# Patient Record
Sex: FEMALE | Race: WHITE | Employment: UNEMPLOYED | URBAN - METROPOLITAN AREA
[De-identification: names, ages, dates, MRNs, and addresses within clinical notes are randomized per-mention and may not be internally consistent; named-entity substitution may affect disease eponyms.]

---

## 2021-08-01 ENCOUNTER — HOSPITAL ENCOUNTER (EMERGENCY)
Age: 51
Discharge: HOME | End: 2021-08-01
Attending: EMERGENCY MEDICINE
Payer: MEDICARE

## 2021-08-01 ENCOUNTER — APPOINTMENT (OUTPATIENT)
Dept: GENERAL RADIOLOGY | Age: 51
End: 2021-08-01
Payer: MEDICARE

## 2021-08-01 VITALS
HEART RATE: 80 BPM | DIASTOLIC BLOOD PRESSURE: 84 MMHG | BODY MASS INDEX: 31.89 KG/M2 | SYSTOLIC BLOOD PRESSURE: 148 MMHG | WEIGHT: 180 LBS | HEIGHT: 63 IN | OXYGEN SATURATION: 98 % | TEMPERATURE: 97.4 F | RESPIRATION RATE: 16 BRPM

## 2021-08-01 DIAGNOSIS — S82.402A CLOSED FRACTURE OF SHAFT OF LEFT FIBULA, UNSPECIFIED FRACTURE MORPHOLOGY, INITIAL ENCOUNTER: Primary | ICD-10-CM

## 2021-08-01 PROCEDURE — 29515 APPLICATION SHORT LEG SPLINT: CPT

## 2021-08-01 PROCEDURE — 99283 EMERGENCY DEPT VISIT LOW MDM: CPT

## 2021-08-01 PROCEDURE — 96372 THER/PROPH/DIAG INJ SC/IM: CPT

## 2021-08-01 PROCEDURE — 73610 X-RAY EXAM OF ANKLE: CPT

## 2021-08-01 PROCEDURE — 6360000002 HC RX W HCPCS: Performed by: EMERGENCY MEDICINE

## 2021-08-01 RX ORDER — KETOROLAC TROMETHAMINE 30 MG/ML
60 INJECTION, SOLUTION INTRAMUSCULAR; INTRAVENOUS ONCE
Status: COMPLETED | OUTPATIENT
Start: 2021-08-01 | End: 2021-08-01

## 2021-08-01 RX ORDER — CYCLOBENZAPRINE HCL 10 MG
10 TABLET ORAL 3 TIMES DAILY PRN
Qty: 30 TABLET | Refills: 0 | Status: SHIPPED | OUTPATIENT
Start: 2021-08-01 | End: 2021-08-11

## 2021-08-01 RX ORDER — KETOROLAC TROMETHAMINE 10 MG/1
10 TABLET, FILM COATED ORAL EVERY 6 HOURS PRN
Qty: 20 TABLET | Refills: 0 | Status: SHIPPED | OUTPATIENT
Start: 2021-08-01

## 2021-08-01 RX ADMIN — KETOROLAC TROMETHAMINE 60 MG: 30 INJECTION, SOLUTION INTRAMUSCULAR at 11:37

## 2021-08-01 ASSESSMENT — PAIN SCALES - GENERAL
PAINLEVEL_OUTOF10: 7
PAINLEVEL_OUTOF10: 5

## 2021-08-01 ASSESSMENT — ENCOUNTER SYMPTOMS
ABDOMINAL PAIN: 0
WHEEZING: 0
PHOTOPHOBIA: 0
SINUS PRESSURE: 0
SORE THROAT: 0
APNEA: 0
VOMITING: 0
BACK PAIN: 0
COLOR CHANGE: 0
RHINORRHEA: 0
CONSTIPATION: 0
ABDOMINAL DISTENTION: 0
EYE PAIN: 0
SHORTNESS OF BREATH: 0
COUGH: 0
DIARRHEA: 0
NAUSEA: 0

## 2021-08-01 ASSESSMENT — PAIN DESCRIPTION - ORIENTATION: ORIENTATION: LEFT

## 2021-08-01 ASSESSMENT — PAIN DESCRIPTION - LOCATION: LOCATION: ANKLE

## 2021-08-01 ASSESSMENT — PAIN DESCRIPTION - DESCRIPTORS: DESCRIPTORS: ACHING

## 2021-08-01 NOTE — ED PROVIDER NOTES
[facility] ED  eMERGENCY dEPARTMENT eNCOUnter      Pt Name: Grady Campbell  MRN: [id number]  [place] 1970  Date of evaluation: 8/1/2021  Provider: Oksana Pallas, MD    CHIEF COMPLAINT       Chief Complaint   Patient presents with    Ankle Pain     left ankle         HISTORY OF PRESENT ILLNESS   (Location/Symptom, Timing/Onset,Context/Setting, Quality, Duration, Modifying Factors, Severity)  Note limiting factors. Grady Campbell is a 46 y.o. female who presents to the emergency department with complaint of left ankle pain and swelling status post trip and fall at home yesterday evening. No head injury with the fall. Able to bear weight but with significant pain. Pain is 7 in a scale of 1-10 and sharp. Pain does not radiate. No other injuries or systemic symptoms. HPI    Nursing Notes were reviewed. REVIEW OF SYSTEMS    (2-9 systems for level 4, 10 or more for level 5)     Review of Systems   Constitutional: Negative. Negative for activity change, appetite change, chills, fatigue and fever. HENT: Negative for congestion, ear discharge, ear pain, hearing loss, rhinorrhea, sinus pressure and sore throat. Eyes: Negative for photophobia, pain and visual disturbance. Respiratory: Negative for apnea, cough, shortness of breath and wheezing. Cardiovascular: Negative for chest pain, palpitations and leg swelling. Gastrointestinal: Negative for abdominal distention, abdominal pain, constipation, diarrhea, nausea and vomiting. Endocrine: Negative for cold intolerance, heat intolerance and polyuria. Genitourinary: Negative for dysuria, flank pain, frequency and urgency. Musculoskeletal: Positive for arthralgias and gait problem. Negative for back pain, myalgias and neck stiffness. Skin: Negative for color change, pallor and rash. Allergic/Immunologic: Negative for food allergies and immunocompromised state.    Neurological: Negative for dizziness, tremors, syncope, weakness, light-headedness and headaches. Psychiatric/Behavioral: Negative for agitation, confusion and hallucinations. All other systems reviewed and are negative. Except as noted above the remainder of the review of systems was reviewed and negative. PAST MEDICAL HISTORY   History reviewed. No pertinent past medical history. SURGICAL HISTORY     History reviewed. No pertinent surgical history. CURRENT MEDICATIONS       Discharge Medication List as of 8/1/2021 12:36 PM          ALLERGIES     Patient has no known allergies. FAMILY HISTORY     History reviewed. No pertinent family history. SOCIAL HISTORY       Social History     Socioeconomic History    Marital status:      Spouse name: None    Number of children: None    Years of education: None    Highest education level: None   Occupational History    None   Tobacco Use    Smoking status: Never Smoker    Smokeless tobacco: Never Used   Substance and Sexual Activity    Alcohol use: Not Currently    Drug use: Not Currently    Sexual activity: None   Other Topics Concern    None   Social History Narrative    None     Social Determinants of Health     Financial Resource Strain:     Difficulty of Paying Living Expenses:    Food Insecurity:     Worried About Running Out of Food in the Last Year:     Ran Out of Food in the Last Year:    Transportation Needs:     Lack of Transportation (Medical):      Lack of Transportation (Non-Medical):    Physical Activity:     Days of Exercise per Week:     Minutes of Exercise per Session:    Stress:     Feeling of Stress :    Social Connections:     Frequency of Communication with Friends and Family:     Frequency of Social Gatherings with Friends and Family:     Attends [employer]oration[employer] Services:     Active Member of Clubs or Organizations:     Attends Club or Organization Meetings:     Marital Status:    Intimate Partner Violence:     Fear of Current or Ex-Partner:     Emotionally Abused:     Physically Abused:     Sexually Abused:        SCREENINGS             PHYSICAL EXAM    (up to 7 for level 4, 8 or more for level 5)     ED Triage Vitals   BP Temp Temp Source Pulse Resp SpO2 Height Weight   08/01/21 1132 08/01/21 1132 08/01/21 1132 08/01/21 1132 08/01/21 1132 08/01/21 1132 08/01/21 1131 08/01/21 1131   (!) 151/100 97.4 °F (36.3 °C) Temporal 80 16 98 % 5' 3\" (1.6 m) 180 lb (81.6 kg)       Physical Exam  Vitals and nursing note reviewed. Constitutional:       General: She is not in acute distress. Appearance: Normal appearance. She is well-developed. She is obese. She is not ill-appearing, toxic-appearing or diaphoretic. HENT:      Head: Normocephalic and atraumatic. Nose: Nose normal. No congestion or rhinorrhea. Mouth/Throat:      Mouth: Mucous membranes are moist.      Pharynx: Oropharynx is clear. No oropharyngeal exudate or posterior oropharyngeal erythema. Eyes:      General: No scleral icterus. Right eye: No discharge. Left eye: No discharge. Extraocular Movements: Extraocular movements intact. Conjunctiva/sclera: Conjunctivae normal.      Pupils: Pupils are equal, round, and reactive to light. Neck:      Thyroid: No thyromegaly. Vascular: No carotid bruit or JVD. Trachea: No tracheal deviation. Cardiovascular:      Rate and Rhythm: Normal rate and regular rhythm. Heart sounds: Normal heart sounds. No murmur heard. No friction rub. No gallop. Pulmonary:      Effort: Pulmonary effort is normal. No respiratory distress. Breath sounds: Normal breath sounds. No stridor. No wheezing, rhonchi or rales. Chest:      Chest wall: No tenderness. Abdominal:      General: Bowel sounds are normal. There is no distension. Palpations: Abdomen is soft. There is no mass. Tenderness: There is no abdominal tenderness. There is no right CVA tenderness, left CVA tenderness, guarding or rebound.       Hernia: No hernia is present. Musculoskeletal:         General: Swelling, tenderness and signs of injury present. No deformity. Normal range of motion. Cervical back: Normal range of motion and neck supple. No rigidity or tenderness. Right lower leg: No edema. Left lower leg: No edema. Lymphadenopathy:      Cervical: No cervical adenopathy. Skin:     General: Skin is warm and dry. Capillary Refill: Capillary refill takes less than 2 seconds. Coloration: Skin is not jaundiced or pale. Findings: No bruising, erythema, lesion or rash. Neurological:      General: No focal deficit present. Mental Status: She is alert and oriented to person, place, and time. Mental status is at baseline. Cranial Nerves: No cranial nerve deficit. Sensory: No sensory deficit. Motor: No weakness or abnormal muscle tone. Coordination: Coordination normal.      Gait: Gait normal.      Deep Tendon Reflexes: Reflexes are normal and symmetric. Reflexes normal.   Psychiatric:         Behavior: Behavior normal.         Thought Content: Thought content normal.         Judgment: Judgment normal.         DIAGNOSTIC RESULTS     EKG: All EKG's are interpreted by the Emergency Department Physician who either signs or Co-signs this chart in the absence of a cardiologist.        RADIOLOGY:   Non-plain film images such as CT, Ultrasound and MRI are read by the radiologist. [facility] are visualized and preliminarily interpreted by the emergency physician with the below findings:    Left ankle x-ray shows distal fibula fracture. Mildly displaced. Ankle mortise joint is intact.     Interpretation per the Radiologist below, if available at the time of this note:    XR ANKLE LEFT (MIN 3 VIEWS)   Final Result   DISTAL LEFT FIBULAR FRACTURE            ED BEDSIDE ULTRASOUND:   Performed by ED Physician - none    LABS:  Labs Reviewed - No data to display    All other labs were within normal range or not returned as of this dictation. EMERGENCY DEPARTMENT COURSE and DIFFERENTIALDIAGNOSIS/MDM:   Vitals:    Vitals:    08/01/21 1131 08/01/21 1132 08/01/21 1240   BP:  (!) 151/100 (!) 148/84   Pulse:  80    Resp:  16    Temp:  97.4 °F (36.3 °C)    TempSrc:  Temporal    SpO2:  98%    Weight: 180 lb (81.6 kg)     Height: 5' 3\" (1.6 m)             MDM  Number of Diagnoses or Management Options     Amount and/or Complexity of Data Reviewed  Tests in the radiology section of CPT®: reviewed and ordered    Risk of Complications, Morbidity, and/or Mortality  Presenting problems: moderate  Diagnostic procedures: moderate  Management options: moderate    Patient Progress  Patient progress: improved      CRITICAL CARE TIME   Total Critical Care time was  minutes, excluding separately reportable procedures. There was a high probability of clinically significant/life threatening deterioration in the patient's condition which required my urgentintervention. CONSULTS:  None    PROCEDURES:  Unless otherwise noted below, none     Splint Application    Date/Time: 8/1/2021 12:18 PM  Performed by: Michaela Nix MD  Authorized by: Michaela Nix MD     Consent:     Consent obtained:  Verbal and written    Consent given by:  Patient    Risks discussed:  Discoloration, numbness, pain and swelling    Alternatives discussed:  Referral, observation, alternative treatment, delayed treatment and no treatment  Pre-procedure details:     Sensation:  Normal    Skin color:  Pink  Procedure details:     Laterality:  Left    Location:  Ankle    Ankle:  L ankle    Strapping: yes      Cast type:  Short leg    Splint type:  Short leg    Supplies:  Elastic bandage, cotton padding and Ortho-Glass  Post-procedure details:     Pain:  Improved    Sensation:  Normal    Skin color:  Pink    Patient tolerance of procedure: Tolerated well, no immediate complications        FINAL IMPRESSION      1.  Closed fracture of shaft of left fibula, unspecified fracture morphology, initial encounter          DISPOSITION/PLAN   DISPOSITION Decision To Discharge 08/01/2021 12:29:36 PM      PATIENT REFERRED TO:  Carlos Ramsey MD  [address] [address] [phone]    In 1 day        DISCHARGE MEDICATIONS:  Discharge Medication List as of 8/1/2021 12:36 PM      START taking these medications    Details   ketorolac (TORADOL) 10 MG tablet Take 1 tablet by mouth every 6 hours as needed for Pain, Disp-20 tablet, R-0Print      cyclobenzaprine (FLEXERIL) 10 MG tablet Take 1 tablet by mouth 3 times daily as needed for Muscle spasms, Disp-30 tablet, R-0Print                (Please note that portions of this note were completed with a voice recognitionprogram.  Efforts were made to edit the dictations but occasionally words are mis-transcribed.)    Morales Swanson MD (electronically signed)  Attending Emergency Physician          Morales Swanson MD  08/01/21 50 Rebekah Nunes MD  08/02/21 7944

## 2021-08-01 NOTE — ED NOTES
Circulation is reassessed in affected extremity & their capillary refill is < 3 seconds. The client denies any increased pain, new pain, or numbness after the splint was applied (left short leg splint).      Jules Andrade RN  08/01/21 0940

## 2021-08-01 NOTE — ED TRIAGE NOTES
Patient slipped on gravel yesterday and rolled left ankle. Complains of pain and swelling to left ankle. She took Motrin for the pain. She has difficulty putting weight on her left ankle and she has applied ice.

## 2021-10-11 ENCOUNTER — OFFICE VISIT (OUTPATIENT)
Dept: FAMILY MEDICINE CLINIC | Age: 51
End: 2021-10-11
Payer: MEDICARE

## 2021-10-11 VITALS
HEART RATE: 70 BPM | OXYGEN SATURATION: 97 % | WEIGHT: 185.4 LBS | DIASTOLIC BLOOD PRESSURE: 72 MMHG | BODY MASS INDEX: 32.85 KG/M2 | SYSTOLIC BLOOD PRESSURE: 136 MMHG | HEIGHT: 63 IN | TEMPERATURE: 97.2 F

## 2021-10-11 DIAGNOSIS — J30.9 ALLERGIC RHINITIS, UNSPECIFIED SEASONALITY, UNSPECIFIED TRIGGER: Primary | ICD-10-CM

## 2021-10-11 PROCEDURE — 1036F TOBACCO NON-USER: CPT

## 2021-10-11 PROCEDURE — G8484 FLU IMMUNIZE NO ADMIN: HCPCS

## 2021-10-11 PROCEDURE — 3017F COLORECTAL CA SCREEN DOC REV: CPT

## 2021-10-11 PROCEDURE — G8427 DOCREV CUR MEDS BY ELIG CLIN: HCPCS

## 2021-10-11 PROCEDURE — G8417 CALC BMI ABV UP PARAM F/U: HCPCS

## 2021-10-11 PROCEDURE — 99213 OFFICE O/P EST LOW 20 MIN: CPT

## 2021-10-11 SDOH — ECONOMIC STABILITY: TRANSPORTATION INSECURITY
IN THE PAST 12 MONTHS, HAS LACK OF TRANSPORTATION KEPT YOU FROM MEETINGS, WORK, OR FROM GETTING THINGS NEEDED FOR DAILY LIVING?: NO

## 2021-10-11 SDOH — ECONOMIC STABILITY: FOOD INSECURITY: WITHIN THE PAST 12 MONTHS, YOU WORRIED THAT YOUR FOOD WOULD RUN OUT BEFORE YOU GOT MONEY TO BUY MORE.: NEVER TRUE

## 2021-10-11 SDOH — ECONOMIC STABILITY: TRANSPORTATION INSECURITY
IN THE PAST 12 MONTHS, HAS THE LACK OF TRANSPORTATION KEPT YOU FROM MEDICAL APPOINTMENTS OR FROM GETTING MEDICATIONS?: NO

## 2021-10-11 SDOH — ECONOMIC STABILITY: FOOD INSECURITY: WITHIN THE PAST 12 MONTHS, THE FOOD YOU BOUGHT JUST DIDN'T LAST AND YOU DIDN'T HAVE MONEY TO GET MORE.: NEVER TRUE

## 2021-10-11 ASSESSMENT — PATIENT HEALTH QUESTIONNAIRE - PHQ9
SUM OF ALL RESPONSES TO PHQ QUESTIONS 1-9: 0
SUM OF ALL RESPONSES TO PHQ QUESTIONS 1-9: 0
1. LITTLE INTEREST OR PLEASURE IN DOING THINGS: 0
SUM OF ALL RESPONSES TO PHQ9 QUESTIONS 1 & 2: 0
SUM OF ALL RESPONSES TO PHQ QUESTIONS 1-9: 0
2. FEELING DOWN, DEPRESSED OR HOPELESS: 0

## 2021-10-11 ASSESSMENT — ENCOUNTER SYMPTOMS
DIARRHEA: 0
VOMITING: 0
EYE ITCHING: 0
NAUSEA: 0
APNEA: 0
FACIAL SWELLING: 0
WHEEZING: 0
CHEST TIGHTNESS: 0
TROUBLE SWALLOWING: 0
SORE THROAT: 0
RHINORRHEA: 0
ABDOMINAL PAIN: 0
BACK PAIN: 0
SINUS PAIN: 0
EYE DISCHARGE: 0
COUGH: 0
EYE PAIN: 0
SHORTNESS OF BREATH: 0
SINUS PRESSURE: 0
COLOR CHANGE: 0

## 2021-10-11 ASSESSMENT — SOCIAL DETERMINANTS OF HEALTH (SDOH): HOW HARD IS IT FOR YOU TO PAY FOR THE VERY BASICS LIKE FOOD, HOUSING, MEDICAL CARE, AND HEATING?: NOT HARD AT ALL

## 2021-10-11 NOTE — PROGRESS NOTES
[address] Encounter  CHIEF COMPLAINT       Chief Complaint   Patient presents with    Ear Problem     ear pain in both ears and headache        HISTORY OF PRESENT ILLNESS   Naa Allen is a 46 y.o. female who presents with:  HPI  Reporting symptoms of congestion 1 week ago mild scratchy throat denies cough  REVIEW OF SYSTEMS     Review of Systems   Constitutional: Negative for appetite change, chills, diaphoresis, fatigue and fever. HENT: Positive for congestion. Negative for ear discharge, ear pain, facial swelling, hearing loss, mouth sores, postnasal drip, rhinorrhea, sinus pressure, sinus pain, sore throat and trouble swallowing. Eyes: Negative for pain, discharge and itching. Respiratory: Negative for apnea, cough, chest tightness, shortness of breath and wheezing. Cardiovascular: Negative for chest pain. Gastrointestinal: Negative for abdominal pain, diarrhea, nausea and vomiting. Endocrine: Negative for cold intolerance and heat intolerance. Genitourinary: Negative for decreased urine volume and difficulty urinating. Musculoskeletal: Negative for arthralgias, back pain and myalgias. Skin: Negative for color change, pallor and rash. Neurological: Negative for dizziness, syncope, weakness, light-headedness and headaches. Hematological: Negative for adenopathy. Psychiatric/Behavioral: Negative for behavioral problems, confusion and sleep disturbance. PAST MEDICAL HISTORY   History reviewed. No pertinent past medical history. SURGICAL HISTORY     Patient  has no past surgical history on file. CURRENT MEDICATIONS       Previous Medications    KETOROLAC (TORADOL) 10 MG TABLET    Take 1 tablet by mouth every 6 hours as needed for Pain     ALLERGIES     Patient is has No Known Allergies. FAMILY HISTORY     Patient'sfamily history is not on file. HISTORY     Patient  reports that she has never smoked.  She has never used smokeless tobacco. She reports previous alcohol use. She reports previous drug use. PHYSICAL EXAM     VITALS  BP: 136/72, Temp: 97.2 °F (36.2 °C), Pulse: 70,  , SpO2: 97 %  Physical Exam  Constitutional:       General: She is not in acute distress. Appearance: Normal appearance. She is not ill-appearing, toxic-appearing or diaphoretic. HENT:      Head: Normocephalic. Right Ear: Tympanic membrane, ear canal and external ear normal. There is no impacted cerumen. Left Ear: Tympanic membrane, ear canal and external ear normal. There is no impacted cerumen. Nose: No congestion or rhinorrhea. Mouth/Throat:      Mouth: Mucous membranes are moist.      Pharynx: Oropharynx is clear. No oropharyngeal exudate or posterior oropharyngeal erythema. Eyes:      General:         Right eye: No discharge. Left eye: No discharge. Cardiovascular:      Rate and Rhythm: Normal rate and regular rhythm. Pulses: Normal pulses. Heart sounds: Normal heart sounds. No murmur heard. No gallop. Pulmonary:      Effort: Pulmonary effort is normal. No respiratory distress. Breath sounds: Normal breath sounds. No stridor. No wheezing, rhonchi or rales. Chest:      Chest wall: No tenderness. Abdominal:      General: Abdomen is flat. There is no distension. Palpations: Abdomen is soft. Tenderness: There is no abdominal tenderness. Musculoskeletal:         General: Normal range of motion. Cervical back: No rigidity or tenderness. Lymphadenopathy:      Cervical: No cervical adenopathy. Skin:     General: Skin is warm and dry. Capillary Refill: Capillary refill takes less than 2 seconds. Coloration: Skin is not pale. Neurological:      General: No focal deficit present. Mental Status: She is alert and oriented to person, place, and time. Mental status is at baseline.    Psychiatric:         Mood and Affect: Mood normal.         Behavior: Behavior normal.       READY CARE COURSE   No orders of the defined types were placed in this encounter. Labs:  No results found for this visit on 10/11/21. IMAGING:  No orders to display     Scheduled Meds:  Continuous Infusions:  PRN Meds:. PROCEDURES:  FINAL IMPRESSION      1. Allergic rhinitis, unspecified seasonality, unspecified trigger        DISPOSITION/PLAN     HISTORY OF PRESENT ILLNESS   Catie Monteiro is a 46 y.o. female who presents with patient reporting sinus congestion ear pressure starting last Thursday. She has been taking Zyrtec however she felt it dried up her secretions too much and she quit taking it yesterday. Pt is afebrile has nontoxic appearance and VS are stable. On physical exam right ear TM appears bulging no erythema left ear within normal limits, pharynx is pink and moist neck is supple no masses lung sounds are clear symptoms are consistent with sinus congestion related to allergies. Recommend patient start taking decongestant phenylephedrine continue Zyrtec. May take half Zyrtec dose if she feels it is drying her out too much. PATIENT REFERRED TO:  Return if symptoms worsen or fail to improve, for Follow up with PCP. DISCHARGE MEDICATIONS:  New Prescriptions    No medications on file     Cannot display discharge medications since this is not an admission.        Ramona Domínguez, APRN - CNP

## 2021-10-11 NOTE — PATIENT INSTRUCTIONS
Take phenylephedrine for congestion, continue using Zyrtec, if you feel this is drying you  up too much dry cutting tablet in half.

## 2023-03-09 LAB
ALANINE AMINOTRANSFERASE (SGPT) (U/L) IN SER/PLAS: 12 U/L (ref 7–45)
ALBUMIN (G/DL) IN SER/PLAS: 4.6 G/DL (ref 3.4–5)
ALKALINE PHOSPHATASE (U/L) IN SER/PLAS: 55 U/L (ref 33–110)
ANION GAP IN SER/PLAS: 13 MMOL/L (ref 10–20)
ASPARTATE AMINOTRANSFERASE (SGOT) (U/L) IN SER/PLAS: 17 U/L (ref 9–39)
BASOPHILS (10*3/UL) IN BLOOD BY AUTOMATED COUNT: 0.05 X10E9/L (ref 0–0.1)
BASOPHILS/100 LEUKOCYTES IN BLOOD BY AUTOMATED COUNT: 1 % (ref 0–2)
BILIRUBIN TOTAL (MG/DL) IN SER/PLAS: 0.5 MG/DL (ref 0–1.2)
CALCIUM (MG/DL) IN SER/PLAS: 9.6 MG/DL (ref 8.6–10.6)
CARBON DIOXIDE, TOTAL (MMOL/L) IN SER/PLAS: 28 MMOL/L (ref 21–32)
CHLORIDE (MMOL/L) IN SER/PLAS: 102 MMOL/L (ref 98–107)
CHOLESTEROL (MG/DL) IN SER/PLAS: 160 MG/DL (ref 0–199)
CHOLESTEROL IN HDL (MG/DL) IN SER/PLAS: 55.4 MG/DL
CHOLESTEROL/HDL RATIO: 2.9
CREATININE (MG/DL) IN SER/PLAS: 0.69 MG/DL (ref 0.5–1.05)
EOSINOPHILS (10*3/UL) IN BLOOD BY AUTOMATED COUNT: 0.15 X10E9/L (ref 0–0.7)
EOSINOPHILS/100 LEUKOCYTES IN BLOOD BY AUTOMATED COUNT: 3 % (ref 0–6)
ERYTHROCYTE DISTRIBUTION WIDTH (RATIO) BY AUTOMATED COUNT: 12 % (ref 11.5–14.5)
ERYTHROCYTE MEAN CORPUSCULAR HEMOGLOBIN CONCENTRATION (G/DL) BY AUTOMATED: 32.6 G/DL (ref 32–36)
ERYTHROCYTE MEAN CORPUSCULAR VOLUME (FL) BY AUTOMATED COUNT: 91 FL (ref 80–100)
ERYTHROCYTES (10*6/UL) IN BLOOD BY AUTOMATED COUNT: 4.76 X10E12/L (ref 4–5.2)
GFR FEMALE: >90 ML/MIN/1.73M2
GLUCOSE (MG/DL) IN SER/PLAS: 93 MG/DL (ref 74–99)
HEMATOCRIT (%) IN BLOOD BY AUTOMATED COUNT: 43.2 % (ref 36–46)
HEMOGLOBIN (G/DL) IN BLOOD: 14.1 G/DL (ref 12–16)
IMMATURE GRANULOCYTES/100 LEUKOCYTES IN BLOOD BY AUTOMATED COUNT: 0.2 % (ref 0–0.9)
LDL: 87 MG/DL (ref 0–99)
LEUKOCYTES (10*3/UL) IN BLOOD BY AUTOMATED COUNT: 5 X10E9/L (ref 4.4–11.3)
LYMPHOCYTES (10*3/UL) IN BLOOD BY AUTOMATED COUNT: 2.12 X10E9/L (ref 1.2–4.8)
LYMPHOCYTES/100 LEUKOCYTES IN BLOOD BY AUTOMATED COUNT: 42.5 % (ref 13–44)
MONOCYTES (10*3/UL) IN BLOOD BY AUTOMATED COUNT: 0.42 X10E9/L (ref 0.1–1)
MONOCYTES/100 LEUKOCYTES IN BLOOD BY AUTOMATED COUNT: 8.4 % (ref 2–10)
NEUTROPHILS (10*3/UL) IN BLOOD BY AUTOMATED COUNT: 2.24 X10E9/L (ref 1.2–7.7)
NEUTROPHILS/100 LEUKOCYTES IN BLOOD BY AUTOMATED COUNT: 44.9 % (ref 40–80)
NRBC (PER 100 WBCS) BY AUTOMATED COUNT: 0 /100 WBC (ref 0–0)
PLATELETS (10*3/UL) IN BLOOD AUTOMATED COUNT: 275 X10E9/L (ref 150–450)
POTASSIUM (MMOL/L) IN SER/PLAS: 4.2 MMOL/L (ref 3.5–5.3)
PROTEIN TOTAL: 7.5 G/DL (ref 6.4–8.2)
SODIUM (MMOL/L) IN SER/PLAS: 139 MMOL/L (ref 136–145)
THYROTROPIN (MIU/L) IN SER/PLAS BY DETECTION LIMIT <= 0.05 MIU/L: 1.91 MIU/L (ref 0.44–3.98)
TRIGLYCERIDE (MG/DL) IN SER/PLAS: 88 MG/DL (ref 0–149)
UREA NITROGEN (MG/DL) IN SER/PLAS: 18 MG/DL (ref 6–23)
VLDL: 18 MG/DL (ref 0–40)

## 2023-08-30 PROBLEM — Z98.82 HISTORY OF BILATERAL BREAST IMPLANTS: Status: ACTIVE | Noted: 2023-08-30

## 2023-08-30 PROBLEM — T14.8XXA HEMATOMA: Status: ACTIVE | Noted: 2023-08-30

## 2023-08-30 PROBLEM — M76.822 POSTERIOR TIBIAL TENDON DYSFUNCTION, LEFT: Status: ACTIVE | Noted: 2023-08-30

## 2023-08-30 PROBLEM — R29.91 ABNORMAL FOOT FINDING: Status: ACTIVE | Noted: 2023-08-30

## 2023-08-30 PROBLEM — M79.89 FAT NECROSIS: Status: ACTIVE | Noted: 2023-08-30

## 2023-08-30 PROBLEM — L65.9 HAIR LOSS: Status: ACTIVE | Noted: 2023-08-30

## 2023-08-30 PROBLEM — S79.921A INJURY OF RIGHT THIGH: Status: ACTIVE | Noted: 2023-08-30

## 2023-08-30 PROBLEM — E03.9 HYPOTHYROIDISM: Status: ACTIVE | Noted: 2023-08-30

## 2023-08-30 PROBLEM — R92.8 ABNORMAL MAMMOGRAM: Status: ACTIVE | Noted: 2023-08-30

## 2023-08-30 PROBLEM — F39 MOOD DISORDER (CMS-HCC): Status: ACTIVE | Noted: 2023-08-30

## 2023-08-30 PROBLEM — E66.811 CLASS 1 OBESITY WITH BODY MASS INDEX (BMI) OF 34.0 TO 34.9 IN ADULT: Status: ACTIVE | Noted: 2023-08-30

## 2023-08-30 PROBLEM — M77.30 HEEL SPUR: Status: ACTIVE | Noted: 2023-08-30

## 2023-08-30 PROBLEM — R03.0 BLOOD PRESSURE ELEVATED WITHOUT HISTORY OF HTN: Status: ACTIVE | Noted: 2023-08-30

## 2023-08-30 PROBLEM — R22.41: Status: ACTIVE | Noted: 2023-08-30

## 2023-08-30 PROBLEM — E66.9 CLASS 1 OBESITY WITH BODY MASS INDEX (BMI) OF 34.0 TO 34.9 IN ADULT: Status: ACTIVE | Noted: 2023-08-30

## 2023-08-30 PROBLEM — M21.40 FLAT FOOT: Status: ACTIVE | Noted: 2023-08-30

## 2023-08-30 PROBLEM — L80 VITILIGO: Status: ACTIVE | Noted: 2023-08-30

## 2023-08-30 PROBLEM — R03.0 ELEVATED BLOOD PRESSURE READING: Status: ACTIVE | Noted: 2023-08-30

## 2023-08-30 RX ORDER — BUPROPION HYDROCHLORIDE 150 MG/1
150 TABLET ORAL DAILY
COMMUNITY
End: 2023-12-15 | Stop reason: SDUPTHER

## 2023-08-30 RX ORDER — LEVOTHYROXINE SODIUM 25 UG/1
25 TABLET ORAL DAILY
COMMUNITY
End: 2024-02-22 | Stop reason: SDUPTHER

## 2023-08-30 RX ORDER — MULTIVITAMIN
1 TABLET ORAL DAILY
COMMUNITY
Start: 2022-05-02

## 2023-08-30 RX ORDER — MELOXICAM 15 MG/1
1 TABLET ORAL DAILY
COMMUNITY
Start: 2022-11-03

## 2023-10-09 ENCOUNTER — PROCEDURE VISIT (OUTPATIENT)
Dept: PRIMARY CARE | Facility: CLINIC | Age: 53
End: 2023-10-09
Payer: COMMERCIAL

## 2023-10-09 VITALS
BODY MASS INDEX: 34.08 KG/M2 | WEIGHT: 185.2 LBS | HEIGHT: 62 IN | DIASTOLIC BLOOD PRESSURE: 97 MMHG | OXYGEN SATURATION: 98 % | HEART RATE: 74 BPM | TEMPERATURE: 98.1 F | SYSTOLIC BLOOD PRESSURE: 133 MMHG

## 2023-10-09 DIAGNOSIS — R03.0 BLOOD PRESSURE ELEVATED WITHOUT HISTORY OF HTN: ICD-10-CM

## 2023-10-09 DIAGNOSIS — E03.9 HYPOTHYROIDISM, UNSPECIFIED TYPE: ICD-10-CM

## 2023-10-09 DIAGNOSIS — Z12.4 CERVICAL CANCER SCREENING: Primary | ICD-10-CM

## 2023-10-09 DIAGNOSIS — F39 MOOD DISORDER (CMS-HCC): ICD-10-CM

## 2023-10-09 PROCEDURE — 99214 OFFICE O/P EST MOD 30 MIN: CPT | Performed by: INTERNAL MEDICINE

## 2023-10-09 PROCEDURE — 87624 HPV HI-RISK TYP POOLED RSLT: CPT

## 2023-10-09 PROCEDURE — 88175 CYTOPATH C/V AUTO FLUID REDO: CPT

## 2023-10-09 NOTE — PROGRESS NOTES
"Subjective   Patient ID: Lizeth Arriaza is a 53 y.o. female who presents for Follow-up (Pap, ).    HPI     Here today for Pap test and breast exam.  Doing well on current medications.  TSH was done in March - in normal range.  Continues to have an area of apparent bruising right lateral knee area at site of prior knee injury.  She has a lateral meniscus tear and is contemplating surgery recommended by Dr. Armenta.    Review of Systems   Constitutional:  Negative for chills, fatigue and fever.   HENT:  Negative for sore throat.    Respiratory:  Negative for cough and shortness of breath.    Cardiovascular:  Negative for chest pain and palpitations.   Gastrointestinal:  Negative for abdominal pain, constipation, diarrhea, nausea and vomiting.   Genitourinary:  Negative for dysuria and hematuria.   Musculoskeletal:  Positive for arthralgias and myalgias.   Neurological:  Positive for numbness. Negative for dizziness, light-headedness and headaches.       Objective   BP (!) 133/97   Pulse 74   Temp 36.7 °C (98.1 °F) (Temporal)   Ht 1.575 m (5' 2\")   Wt 84 kg (185 lb 3.2 oz)   SpO2 98%   BMI 33.87 kg/m²     Physical Exam  Constitutional:       Appearance: Normal appearance.   HENT:      Head: Normocephalic and atraumatic.   Cardiovascular:      Rate and Rhythm: Normal rate and regular rhythm.      Pulses: Normal pulses.   Pulmonary:      Effort: Pulmonary effort is normal. No respiratory distress.      Breath sounds: Normal breath sounds. No wheezing.   Chest:      Comments: Bilateral breast implants  No obvious masses, no axillary adenopathy  Abdominal:      General: There is no distension.      Palpations: Abdomen is soft.      Tenderness: There is no abdominal tenderness.   Genitourinary:     Labia:         Right: No rash or lesion.         Left: No rash or lesion.       Comments: Pap test obtained  Not able to advance brush through os  Musculoskeletal:      Right lower leg: No edema.      Left lower leg: No " edema.   Neurological:      General: No focal deficit present.      Mental Status: She is alert and oriented to person, place, and time. Mental status is at baseline.   Psychiatric:         Mood and Affect: Mood normal.         Behavior: Behavior normal.         Thought Content: Thought content normal.         Judgment: Judgment normal.         Assessment/Plan   Problem List Items Addressed This Visit             ICD-10-CM    Blood pressure elevated without history of HTN R03.0    Hypothyroidism E03.9    Mood disorder (CMS/HCC) F39     Other Visit Diagnoses         Codes    Cervical cancer screening    -  Primary Z12.4    Relevant Orders    THINPREP PAP TEST    HPV DNA High Risk With Genotype          Cervical cancer screening - Pap plus HPV today.    Elevated BP - try to work on healthy habits.  Aim for 30 minutes of aerobic exercise, 5 times per week.  Try to cut back on processed foods, including foods made with flour, sugar, added salt, and saturated fat.    Hypothyroidism - TSH in normal range 3/9/23, continue same dose levothyroxine.    Lab Results   Component Value Date    TSH 1.91 03/09/2023     Mood disorder - doing well on bupropion.    To follow-up with Dr. Armenta regarding right lateral meniscus tear.    Follow-up in 6 months and PRN.

## 2023-10-12 PROBLEM — S83.281A TEAR OF LATERAL MENISCUS OF RIGHT KNEE, CURRENT: Status: ACTIVE | Noted: 2023-10-12

## 2023-10-12 ASSESSMENT — ENCOUNTER SYMPTOMS
CONSTIPATION: 0
DIARRHEA: 0
PALPITATIONS: 0
VOMITING: 0
NUMBNESS: 1
SORE THROAT: 0
CHILLS: 0
HEMATURIA: 0
HEADACHES: 0
NAUSEA: 0
MYALGIAS: 1
DYSURIA: 0
ABDOMINAL PAIN: 0
DIZZINESS: 0
ARTHRALGIAS: 1
SHORTNESS OF BREATH: 0
COUGH: 0
FEVER: 0
FATIGUE: 0
LIGHT-HEADEDNESS: 0

## 2023-10-25 LAB
CYTOLOGY CMNT CVX/VAG CYTO-IMP: NORMAL
HPV HR GENOTYPES PNL CVX NAA+PROBE: NEGATIVE
HPV HR GENOTYPES PNL CVX NAA+PROBE: NEGATIVE
HPV16 DNA SPEC QL NAA+PROBE: NEGATIVE
HPV18 DNA SPEC QL NAA+PROBE: NEGATIVE
LAB AP HPV GENOTYPE QUESTION: YES
LAB AP HPV HR: NORMAL
LABORATORY COMMENT REPORT: NORMAL
PATH REPORT.TOTAL CANCER: NORMAL

## 2023-10-27 DIAGNOSIS — B96.89 BACTERIAL VAGINOSIS: Primary | ICD-10-CM

## 2023-10-27 DIAGNOSIS — N76.0 BACTERIAL VAGINOSIS: Primary | ICD-10-CM

## 2023-10-27 RX ORDER — METRONIDAZOLE 500 MG/1
500 TABLET ORAL 2 TIMES DAILY
Qty: 14 TABLET | Refills: 0 | Status: SHIPPED | OUTPATIENT
Start: 2023-10-27 | End: 2023-11-03

## 2023-10-30 ENCOUNTER — TELEPHONE (OUTPATIENT)
Dept: PRIMARY CARE | Facility: CLINIC | Age: 53
End: 2023-10-30
Payer: COMMERCIAL

## 2023-10-30 NOTE — TELEPHONE ENCOUNTER
Patient called asking if Dr. Adams could call patient regarding the pap test she had a couple of weeks ago.  Patient phone [phone].

## 2023-12-15 ENCOUNTER — TELEPHONE (OUTPATIENT)
Dept: PRIMARY CARE | Facility: CLINIC | Age: 53
End: 2023-12-15
Payer: COMMERCIAL

## 2023-12-15 DIAGNOSIS — F39 MOOD DISORDER (CMS-HCC): Primary | ICD-10-CM

## 2023-12-15 RX ORDER — BUPROPION HYDROCHLORIDE 150 MG/1
150 TABLET ORAL DAILY
Qty: 90 TABLET | Refills: 3 | Status: SHIPPED | OUTPATIENT
Start: 2023-12-15 | End: 2024-12-14

## 2023-12-15 NOTE — TELEPHONE ENCOUNTER
Rx line @ 134    Bupropion  mg     Pt stated [facility]  ( ph [phone] ) told her they had reached out to us and we have not replied.

## 2024-02-22 ENCOUNTER — OFFICE VISIT (OUTPATIENT)
Dept: PRIMARY CARE | Facility: CLINIC | Age: 54
End: 2024-02-22
Payer: COMMERCIAL

## 2024-02-22 VITALS
WEIGHT: 192.3 LBS | BODY MASS INDEX: 35.39 KG/M2 | HEIGHT: 62 IN | TEMPERATURE: 96.7 F | HEART RATE: 63 BPM | SYSTOLIC BLOOD PRESSURE: 159 MMHG | DIASTOLIC BLOOD PRESSURE: 101 MMHG | OXYGEN SATURATION: 99 %

## 2024-02-22 DIAGNOSIS — H70.91 MASTOIDITIS OF RIGHT SIDE: Primary | ICD-10-CM

## 2024-02-22 DIAGNOSIS — E03.9 HYPOTHYROIDISM, UNSPECIFIED TYPE: ICD-10-CM

## 2024-02-22 PROCEDURE — 1036F TOBACCO NON-USER: CPT | Performed by: INTERNAL MEDICINE

## 2024-02-22 PROCEDURE — 99213 OFFICE O/P EST LOW 20 MIN: CPT | Performed by: INTERNAL MEDICINE

## 2024-02-22 RX ORDER — AMOXICILLIN AND CLAVULANATE POTASSIUM 875; 125 MG/1; MG/1
875 TABLET, FILM COATED ORAL 2 TIMES DAILY
Qty: 14 TABLET | Refills: 0 | Status: SHIPPED | OUTPATIENT
Start: 2024-02-22 | End: 2024-02-29

## 2024-02-22 RX ORDER — LEVOTHYROXINE SODIUM 25 UG/1
25 TABLET ORAL DAILY
Qty: 30 TABLET | Refills: 8 | Status: SHIPPED | OUTPATIENT
Start: 2024-02-22 | End: 2024-03-23

## 2024-02-22 RX ORDER — LEVOTHYROXINE SODIUM 25 UG/1
25 TABLET ORAL DAILY
Qty: 30 TABLET | Refills: 0 | Status: SHIPPED | OUTPATIENT
Start: 2024-02-22 | End: 2024-02-22 | Stop reason: SDUPTHER

## 2024-02-22 ASSESSMENT — ENCOUNTER SYMPTOMS
SINUS PRESSURE: 0
LIGHT-HEADEDNESS: 0
HEADACHES: 0
COUGH: 0
TROUBLE SWALLOWING: 0
FEVER: 0
SHORTNESS OF BREATH: 0
VOICE CHANGE: 0
NAUSEA: 0
FACIAL SWELLING: 0
SORE THROAT: 0
DIARRHEA: 0
PALPITATIONS: 0
DIZZINESS: 0
ABDOMINAL PAIN: 0
FATIGUE: 0
CONFUSION: 0
SINUS PAIN: 0
CHILLS: 0
VOMITING: 0
CONSTIPATION: 0

## 2024-02-22 ASSESSMENT — PATIENT HEALTH QUESTIONNAIRE - PHQ9
2. FEELING DOWN, DEPRESSED OR HOPELESS: NOT AT ALL
SUM OF ALL RESPONSES TO PHQ9 QUESTIONS 1 AND 2: 0
1. LITTLE INTEREST OR PLEASURE IN DOING THINGS: NOT AT ALL

## 2024-02-22 NOTE — PROGRESS NOTES
CHIEF COMPLAINT  Earache (Right ear, behind ear)    HISTORY OF PRESENT ILLNESS  Lizeth Arriaza is a 53 y.o. female presents today for follow up of Earache (Right ear, behind ear)    HPI    Pain behind right ear, throbbing.  Started about 2-3 days ago.  Initially ear was not painful, however now it feels full.  No fever, chills.  Tylenol 1000 mg helps somewhat.     She noticed a mild discomfort in that area a few days before that, but the severe throbbing pain started more recently.  Needs refill on her levothyroxine.    Had massage about 2 weeks ago, did have some work on her neck, but was not painful directly after the massage.    REVIEW OF SYSTEMS  Review of Systems   Constitutional:  Negative for chills, fatigue and fever.   HENT:  Positive for ear pain. Negative for dental problem, ear discharge, facial swelling, hearing loss, sinus pressure, sinus pain, sore throat, tinnitus, trouble swallowing and voice change.    Respiratory:  Negative for cough and shortness of breath.    Cardiovascular:  Negative for chest pain, palpitations and leg swelling.   Gastrointestinal:  Negative for abdominal pain, constipation, diarrhea, nausea and vomiting.   Skin:  Negative for rash.   Neurological:  Negative for dizziness, light-headedness and headaches.   Psychiatric/Behavioral:  Negative for confusion.        ALLERGIES  House dust and Pollen extracts    MEDICATIONS  Current Outpatient Medications   Medication Instructions    amoxicillin-pot clavulanate (Augmentin) 875-125 mg tablet 875 mg, oral, 2 times daily    buPROPion XL (WELLBUTRIN XL) 150 mg, oral, Daily, Do not crush, chew, or split.    levothyroxine (SYNTHROID, LEVOXYL) 25 mcg, oral, Daily    meloxicam (Mobic) 15 mg tablet 1 tablet, oral, Daily    multivitamin tablet 1 tablet, oral, Daily       TOBACCO USE  Social History     Tobacco Use   Smoking Status Never   Smokeless Tobacco Never       DEPRESSION SCREEN  Over the past 2 weeks, how often have you been bothered  "by any of the following problems?  Little interest or pleasure in doing things: Not at all  Feeling down, depressed, or hopeless: Not at all    SURGICAL HISTORY  Past Surgical History:  05/02/2022: OTHER SURGICAL HISTORY      Comment:  No history of surgery       OBJECTIVE    BP (!) 159/101   Pulse 63   Temp 35.9 °C (96.7 °F)   Ht 1.575 m (5' 2\")   Wt 87.2 kg (192 lb 4.8 oz)   SpO2 99%   BMI 35.17 kg/m²    BMI: Estimated body mass index is 35.17 kg/m² as calculated from the following:    Height as of this encounter: 1.575 m (5' 2\").    Weight as of this encounter: 87.2 kg (192 lb 4.8 oz).    BP Readings from Last 3 Encounters:   02/22/24 (!) 159/101   10/09/23 (!) 133/97   07/10/23 114/87      Wt Readings from Last 3 Encounters:   02/22/24 87.2 kg (192 lb 4.8 oz)   10/09/23 84 kg (185 lb 3.2 oz)   07/10/23 83.9 kg (185 lb)        PHYSICAL EXAM  Physical Exam  Constitutional:       Appearance: Normal appearance. She is obese.   HENT:      Head: Normocephalic and atraumatic.      Right Ear: Tympanic membrane and ear canal normal. There is no impacted cerumen.      Left Ear: Tympanic membrane and ear canal normal. There is no impacted cerumen.      Ears:      Comments: +tenderness over right mastoid process     Mouth/Throat:      Mouth: Mucous membranes are moist.      Pharynx: Oropharynx is clear. No oropharyngeal exudate.   Cardiovascular:      Rate and Rhythm: Normal rate and regular rhythm.      Heart sounds: No murmur heard.  Pulmonary:      Effort: Pulmonary effort is normal. No respiratory distress.      Breath sounds: Normal breath sounds. No wheezing.   Musculoskeletal:      Cervical back: No rigidity.   Lymphadenopathy:      Cervical: No cervical adenopathy.   Neurological:      General: No focal deficit present.      Mental Status: She is alert and oriented to person, place, and time. Mental status is at baseline.   Psychiatric:         Mood and Affect: Mood normal.         Behavior: Behavior normal.   "       Thought Content: Thought content normal.         Judgment: Judgment normal.          ASSESSMENT AND PLAN  Assessment/Plan   Problem List Items Addressed This Visit       Hypothyroidism    Relevant Medications    levothyroxine (Synthroid, Levoxyl) 25 mcg tablet    Mastoiditis of right side - Primary    Relevant Medications    amoxicillin-pot clavulanate (Augmentin) 875-125 mg tablet     Pain and tenderness of right mastoid process - possible mild / early mastoiditis.  Her right TM looks good - no erythema or bulging.  Will start with empiric Augmentin.  Can alternate Aleve and Tylenol for pain.  Could also potentially be musculoskeletal, would would also respond well to Aleve and Tyleno.l.  Follow-up if symptoms worsen or fail to improve.    - She did say that sometimes the pain is severe, 12/10.  I encouraged her to look for rash, as occasionally shingles prodrome can present this way.  If rash occurs, should seek medical attention / call office for answering service over weekend so that antiviral medication can be started right away.    Hypothyroidism - levothyroxine refilled.    Follow-up for well visit in the fall.

## 2024-08-30 ENCOUNTER — TELEPHONE (OUTPATIENT)
Dept: PRIMARY CARE | Facility: CLINIC | Age: 54
End: 2024-08-30
Payer: COMMERCIAL

## 2024-08-30 DIAGNOSIS — I10 BENIGN ESSENTIAL HYPERTENSION: Primary | ICD-10-CM

## 2024-08-30 DIAGNOSIS — F39 MOOD DISORDER (CMS-HCC): ICD-10-CM

## 2024-08-30 RX ORDER — ATENOLOL 25 MG/1
25 TABLET ORAL DAILY
Qty: 90 TABLET | Refills: 0 | Status: SHIPPED | OUTPATIENT
Start: 2024-08-30 | End: 2025-02-26

## 2024-08-30 RX ORDER — BUPROPION HYDROCHLORIDE 300 MG/1
300 TABLET ORAL DAILY
Qty: 90 TABLET | Refills: 0 | Status: SHIPPED | OUTPATIENT
Start: 2024-08-30 | End: 2025-08-30

## 2024-08-30 NOTE — TELEPHONE ENCOUNTER
Patient called stating she has been checking her bp first thing in the morning and her bp is 140-160/.  Patient having major issues at work and it is very stressful and upsetting.  Patient is asking for rx for her elevated bp and asking if Dr. Adams could increase her Wellbutrin if it would help.  Patient states she is going on vacation tomorrow x 1 wk.  Patient states it used to be she wanted to kill someone and now wants to cry.  Patient states she is highly emotional.    -   [id number] - [place], [place] - [address]  [address] [address] [facility] [address]  Phone: [phone] Fax: [phone]    Patient [phone]

## 2024-09-30 ENCOUNTER — APPOINTMENT (OUTPATIENT)
Dept: PRIMARY CARE | Facility: CLINIC | Age: 54
End: 2024-09-30
Payer: COMMERCIAL

## 2024-09-30 VITALS
HEIGHT: 62 IN | DIASTOLIC BLOOD PRESSURE: 97 MMHG | OXYGEN SATURATION: 98 % | WEIGHT: 183.6 LBS | TEMPERATURE: 97.9 F | SYSTOLIC BLOOD PRESSURE: 131 MMHG | BODY MASS INDEX: 33.79 KG/M2 | HEART RATE: 58 BPM

## 2024-09-30 DIAGNOSIS — M79.89 FAT NECROSIS: ICD-10-CM

## 2024-09-30 DIAGNOSIS — S79.921S: ICD-10-CM

## 2024-09-30 DIAGNOSIS — S79.921D INJURY OF RIGHT THIGH, SUBSEQUENT ENCOUNTER: ICD-10-CM

## 2024-09-30 DIAGNOSIS — Z12.11 ENCOUNTER FOR SCREENING FOR MALIGNANT NEOPLASM OF COLON: ICD-10-CM

## 2024-09-30 DIAGNOSIS — E03.9 ACQUIRED HYPOTHYROIDISM: ICD-10-CM

## 2024-09-30 DIAGNOSIS — R03.0 BLOOD PRESSURE ELEVATED WITHOUT HISTORY OF HTN: ICD-10-CM

## 2024-09-30 DIAGNOSIS — Z28.21 INFLUENZA VACCINATION DECLINED: ICD-10-CM

## 2024-09-30 DIAGNOSIS — Z00.00 HEALTHCARE MAINTENANCE: Primary | ICD-10-CM

## 2024-09-30 DIAGNOSIS — Z12.31 VISIT FOR SCREENING MAMMOGRAM: ICD-10-CM

## 2024-09-30 PROBLEM — H70.91 MASTOIDITIS OF RIGHT SIDE: Status: RESOLVED | Noted: 2024-02-22 | Resolved: 2024-09-30

## 2024-09-30 PROCEDURE — 99396 PREV VISIT EST AGE 40-64: CPT | Performed by: INTERNAL MEDICINE

## 2024-09-30 PROCEDURE — 1036F TOBACCO NON-USER: CPT | Performed by: INTERNAL MEDICINE

## 2024-09-30 PROCEDURE — 3008F BODY MASS INDEX DOCD: CPT | Performed by: INTERNAL MEDICINE

## 2024-09-30 ASSESSMENT — ENCOUNTER SYMPTOMS
DYSURIA: 0
SORE THROAT: 0
COUGH: 0
LIGHT-HEADEDNESS: 1
NAUSEA: 0
CONFUSION: 0
CONSTIPATION: 0
VOMITING: 0
FATIGUE: 0
HEADACHES: 0
DIARRHEA: 0
SHORTNESS OF BREATH: 0
ARTHRALGIAS: 1
DIZZINESS: 0
PALPITATIONS: 0
FEVER: 0
HEMATURIA: 0
CHILLS: 0
ABDOMINAL PAIN: 0

## 2024-09-30 ASSESSMENT — PATIENT HEALTH QUESTIONNAIRE - PHQ9
SUM OF ALL RESPONSES TO PHQ9 QUESTIONS 1 AND 2: 0
1. LITTLE INTEREST OR PLEASURE IN DOING THINGS: NOT AT ALL
2. FEELING DOWN, DEPRESSED OR HOPELESS: NOT AT ALL

## 2024-09-30 NOTE — PROGRESS NOTES
CHIEF COMPLAINT  Annual Exam    HISTORY OF PRESENT ILLNESS  Lizeth Arriaza is a 54 y.o. female presents today for follow up of Annual Exam    HPI    Patient called office 1 month ago.  Wanted to increase bupropion, also reported her BP was high.  Bupropion increased to 300 mg daily. Has helped somewhat.  Rx sent for atenolol - she only took it a few times and felt dizzy.    She would like a referral again to plastic surgery evaluate her right thigh soft tissue asymmetry (caused by prior injury).    Due for fasting labs.     REVIEW OF SYSTEMS  Review of Systems   Constitutional:  Negative for chills, fatigue and fever.   HENT:  Negative for sore throat.    Respiratory:  Negative for cough and shortness of breath.    Cardiovascular:  Negative for chest pain, palpitations and leg swelling.   Gastrointestinal:  Negative for abdominal pain, constipation, diarrhea, nausea and vomiting.   Genitourinary:  Negative for dysuria and hematuria.   Musculoskeletal:  Positive for arthralgias.   Skin:  Negative for rash.   Neurological:  Positive for light-headedness. Negative for dizziness and headaches.   Psychiatric/Behavioral:  Negative for confusion.        ALLERGIES  House dust and Pollen extracts    MEDICATIONS  Current Outpatient Medications   Medication Instructions    atenolol (TENORMIN) 25 mg, oral, Daily    buPROPion XL (WELLBUTRIN XL) 300 mg, oral, Daily, Do not crush, chew, or split.    levothyroxine (SYNTHROID, LEVOXYL) 25 mcg, oral, Daily    meloxicam (Mobic) 15 mg tablet 1 tablet, oral, Daily    multivitamin tablet 1 tablet, oral, Daily       TOBACCO USE  Social History     Tobacco Use   Smoking Status Never   Smokeless Tobacco Never       DEPRESSION SCREEN  Over the past 2 weeks, how often have you been bothered by any of the following problems?  Little interest or pleasure in doing things: Not at all  Feeling down, depressed, or hopeless: Not at all    SURGICAL HISTORY  Past Surgical History:  05/02/2022: OTHER  "SURGICAL HISTORY      Comment:  No history of surgery       OBJECTIVE    BP (!) 131/97   Pulse 58   Temp 36.6 °C (97.9 °F)   Ht 1.575 m (5' 2\")   Wt 83.3 kg (183 lb 9.6 oz)   SpO2 98%   BMI 33.58 kg/m²    BMI: Estimated body mass index is 33.58 kg/m² as calculated from the following:    Height as of this encounter: 1.575 m (5' 2\").    Weight as of this encounter: 83.3 kg (183 lb 9.6 oz).    BP Readings from Last 3 Encounters:   09/30/24 (!) 131/97   02/22/24 (!) 159/101   10/09/23 (!) 133/97      Wt Readings from Last 3 Encounters:   09/30/24 83.3 kg (183 lb 9.6 oz)   02/22/24 87.2 kg (192 lb 4.8 oz)   10/09/23 84 kg (185 lb 3.2 oz)        PHYSICAL EXAM  Physical Exam  Constitutional:       Appearance: Normal appearance. She is obese.   HENT:      Head: Normocephalic and atraumatic.      Right Ear: Tympanic membrane and ear canal normal. There is no impacted cerumen.      Left Ear: Tympanic membrane and ear canal normal. There is no impacted cerumen.   Neck:      Vascular: No carotid bruit.   Cardiovascular:      Rate and Rhythm: Normal rate and regular rhythm.      Heart sounds: No murmur heard.  Pulmonary:      Effort: Pulmonary effort is normal. No respiratory distress.      Breath sounds: Normal breath sounds. No wheezing.   Abdominal:      General: Abdomen is flat. There is no distension.      Palpations: Abdomen is soft.      Tenderness: There is no abdominal tenderness.   Neurological:      General: No focal deficit present.      Mental Status: She is alert and oriented to person, place, and time. Mental status is at baseline.   Psychiatric:         Mood and Affect: Mood normal.         Behavior: Behavior normal.         Thought Content: Thought content normal.         Judgment: Judgment normal.          ASSESSMENT AND PLAN  Assessment/Plan   Problem List Items Addressed This Visit       Blood pressure elevated without history of HTN    Fat necrosis    Relevant Orders    Referral to Plastic Surgery    " Hypothyroidism    Relevant Orders    Thyroid Stimulating Hormone    Injury of right thigh    Relevant Orders    Referral to Plastic Surgery    Healthcare maintenance - Primary    Relevant Orders    Lipid Panel    Comprehensive Metabolic Panel    CBC and Auto Differential    Influenza vaccination declined     Other Visit Diagnoses       Visit for screening mammogram        Relevant Orders    BI mammo bilateral screening tomosynthesis    Encounter for screening for malignant neoplasm of colon        Relevant Orders    Cologuard® colon cancer screening          Well Visit    High BP, possible white coat syndrome - advised to check BP at home, contact me if > 140 / > 90.  She has Rx for atenolol at home that she can try again.    Routine fasting labs.    Hypothyroidism - check TSH, adjust levothyroxine as necessary.    Right thigh asymmetry, fat necrosis due to prior injury - referral to plastic surgeon.     Mammogram ordered.    Pap 2023, repeat 2026.    Colon cancer screening - Cologuard ordered.    Reviewed signs of skin cancer and importance of sun protection.    Advised to stay up to date with routine dental and eye exams.    Flu shot declined.  Shingrix is recommended.    Follow-up annually, sooner pending results.

## 2024-11-12 ENCOUNTER — HOSPITAL ENCOUNTER (OUTPATIENT)
Dept: RADIOLOGY | Facility: HOSPITAL | Age: 54
Discharge: HOME | End: 2024-11-12
Payer: COMMERCIAL

## 2024-11-12 ENCOUNTER — LAB (OUTPATIENT)
Dept: LAB | Facility: LAB | Age: 54
End: 2024-11-12
Payer: COMMERCIAL

## 2024-11-12 VITALS — WEIGHT: 172 LBS | BODY MASS INDEX: 31.46 KG/M2

## 2024-11-12 DIAGNOSIS — Z12.31 VISIT FOR SCREENING MAMMOGRAM: ICD-10-CM

## 2024-11-12 DIAGNOSIS — E03.9 ACQUIRED HYPOTHYROIDISM: ICD-10-CM

## 2024-11-12 DIAGNOSIS — Z00.00 HEALTHCARE MAINTENANCE: ICD-10-CM

## 2024-11-12 LAB
ALBUMIN SERPL BCP-MCNC: 4.6 G/DL (ref 3.4–5)
ALP SERPL-CCNC: 66 U/L (ref 33–110)
ALT SERPL W P-5'-P-CCNC: 12 U/L (ref 7–45)
ANION GAP SERPL CALC-SCNC: 11 MMOL/L (ref 10–20)
AST SERPL W P-5'-P-CCNC: 18 U/L (ref 9–39)
BASOPHILS # BLD AUTO: 0.05 X10*3/UL (ref 0–0.1)
BASOPHILS NFR BLD AUTO: 0.8 %
BILIRUB SERPL-MCNC: 0.5 MG/DL (ref 0–1.2)
BUN SERPL-MCNC: 17 MG/DL (ref 6–23)
CALCIUM SERPL-MCNC: 9.8 MG/DL (ref 8.6–10.3)
CHLORIDE SERPL-SCNC: 102 MMOL/L (ref 98–107)
CHOLEST SERPL-MCNC: 170 MG/DL (ref 0–199)
CHOLESTEROL/HDL RATIO: 2.6
CO2 SERPL-SCNC: 30 MMOL/L (ref 21–32)
CREAT SERPL-MCNC: 0.84 MG/DL (ref 0.5–1.05)
EGFRCR SERPLBLD CKD-EPI 2021: 83 ML/MIN/1.73M*2
EOSINOPHIL # BLD AUTO: 0.13 X10*3/UL (ref 0–0.7)
EOSINOPHIL NFR BLD AUTO: 2 %
ERYTHROCYTE [DISTWIDTH] IN BLOOD BY AUTOMATED COUNT: 12.7 % (ref 11.5–14.5)
GLUCOSE SERPL-MCNC: 83 MG/DL (ref 74–99)
HCT VFR BLD AUTO: 41.8 % (ref 36–46)
HDLC SERPL-MCNC: 65.6 MG/DL
HGB BLD-MCNC: 13.9 G/DL (ref 12–16)
IMM GRANULOCYTES # BLD AUTO: 0.01 X10*3/UL (ref 0–0.7)
IMM GRANULOCYTES NFR BLD AUTO: 0.2 % (ref 0–0.9)
LDLC SERPL CALC-MCNC: 88 MG/DL
LYMPHOCYTES # BLD AUTO: 1.87 X10*3/UL (ref 1.2–4.8)
LYMPHOCYTES NFR BLD AUTO: 28.9 %
MCH RBC QN AUTO: 31 PG (ref 26–34)
MCHC RBC AUTO-ENTMCNC: 33.3 G/DL (ref 32–36)
MCV RBC AUTO: 93 FL (ref 80–100)
MONOCYTES # BLD AUTO: 0.48 X10*3/UL (ref 0.1–1)
MONOCYTES NFR BLD AUTO: 7.4 %
NEUTROPHILS # BLD AUTO: 3.93 X10*3/UL (ref 1.2–7.7)
NEUTROPHILS NFR BLD AUTO: 60.7 %
NON HDL CHOLESTEROL: 104 MG/DL (ref 0–149)
NRBC BLD-RTO: 0 /100 WBCS (ref 0–0)
PLATELET # BLD AUTO: 234 X10*3/UL (ref 150–450)
POTASSIUM SERPL-SCNC: 4.2 MMOL/L (ref 3.5–5.3)
PROT SERPL-MCNC: 7.4 G/DL (ref 6.4–8.2)
RBC # BLD AUTO: 4.48 X10*6/UL (ref 4–5.2)
SODIUM SERPL-SCNC: 139 MMOL/L (ref 136–145)
TRIGL SERPL-MCNC: 80 MG/DL (ref 0–149)
TSH SERPL-ACNC: 1.67 MIU/L (ref 0.44–3.98)
VLDL: 16 MG/DL (ref 0–40)
WBC # BLD AUTO: 6.5 X10*3/UL (ref 4.4–11.3)

## 2024-11-12 PROCEDURE — 84443 ASSAY THYROID STIM HORMONE: CPT

## 2024-11-12 PROCEDURE — 77067 SCR MAMMO BI INCL CAD: CPT | Performed by: STUDENT IN AN ORGANIZED HEALTH CARE EDUCATION/TRAINING PROGRAM

## 2024-11-12 PROCEDURE — 85025 COMPLETE CBC W/AUTO DIFF WBC: CPT

## 2024-11-12 PROCEDURE — 77067 SCR MAMMO BI INCL CAD: CPT

## 2024-11-12 PROCEDURE — 77063 BREAST TOMOSYNTHESIS BI: CPT | Performed by: STUDENT IN AN ORGANIZED HEALTH CARE EDUCATION/TRAINING PROGRAM

## 2024-11-12 PROCEDURE — 80061 LIPID PANEL: CPT

## 2024-11-12 PROCEDURE — 36415 COLL VENOUS BLD VENIPUNCTURE: CPT

## 2024-11-12 PROCEDURE — 80053 COMPREHEN METABOLIC PANEL: CPT

## 2024-11-15 ENCOUNTER — TELEPHONE (OUTPATIENT)
Dept: PRIMARY CARE | Facility: CLINIC | Age: 54
End: 2024-11-15
Payer: COMMERCIAL

## 2024-11-15 NOTE — TELEPHONE ENCOUNTER
----- Message from Emily Adams sent at 11/15/2024  4:18 PM EST -----  Please advise mammogram is normal.  Continue annual screening.

## 2024-12-06 ENCOUNTER — TELEPHONE (OUTPATIENT)
Dept: PRIMARY CARE | Facility: CLINIC | Age: 54
End: 2024-12-06
Payer: COMMERCIAL

## 2024-12-06 DIAGNOSIS — N30.90 CYSTITIS: Primary | ICD-10-CM

## 2024-12-06 RX ORDER — SULFAMETHOXAZOLE AND TRIMETHOPRIM 800; 160 MG/1; MG/1
1 TABLET ORAL 2 TIMES DAILY
Qty: 14 TABLET | Refills: 0 | Status: SHIPPED | OUTPATIENT
Start: 2024-12-06 | End: 2024-12-13

## 2024-12-06 NOTE — TELEPHONE ENCOUNTER
Pt called, she has a UTI. Frequency, burning    She is asking for an antibiotic to be sent in. She said her work schedule makes it really hard for her to come in.      Please advise.       [id number] - [place] - [address]  [address] [place] [address]  Phone: [phone] Fax: [phone]

## 2024-12-19 ENCOUNTER — APPOINTMENT (OUTPATIENT)
Dept: PLASTIC SURGERY | Facility: CLINIC | Age: 54
End: 2024-12-19
Payer: COMMERCIAL

## 2024-12-19 VITALS
HEART RATE: 62 BPM | HEIGHT: 62 IN | WEIGHT: 172 LBS | SYSTOLIC BLOOD PRESSURE: 128 MMHG | DIASTOLIC BLOOD PRESSURE: 78 MMHG | BODY MASS INDEX: 31.65 KG/M2

## 2024-12-19 DIAGNOSIS — S79.921S: ICD-10-CM

## 2024-12-19 DIAGNOSIS — M79.89 FAT NECROSIS: ICD-10-CM

## 2024-12-19 PROCEDURE — 3008F BODY MASS INDEX DOCD: CPT | Performed by: SURGERY

## 2024-12-19 PROCEDURE — 99205 OFFICE O/P NEW HI 60 MIN: CPT | Performed by: SURGERY

## 2024-12-19 NOTE — LETTER
December 19, 2024     Emily Adams MD  [facility] [address] [place] [address]    Patient: Lizeth Arriaza   YOB: 1970   Date of Visit: 12/19/2024       Dear Dr. Emily Adams MD:    Thank you for referring Lizeth Arriaza to me for evaluation. Below are my notes for this consultation.  If you have questions, please do not hesitate to call me. I look forward to following your patient along with you.       Sincerely,     Edgar De La Cruz MD      CC: No Recipients  ______________________________________________________________________________________                    Division of Plastic & Reconstructive Surgery            New patient visit    Date: 12/19/2024   Referring Provider:   Emily Adams MD  [address] [place] [address] [address] [place] [address]      Subjective  Lizeth Arriaza is a 54 y.o. female who was referred by Emily Adams  for right thigh asymmetry, fat necrosis due to prior injury.  Patient indicates that she has a history of a fall through a floor event that was open while she was sweeping at an approximately 2021, the right thigh fell through the floor and got stuck all the level of her mid thigh causing injury.  She indicates that that she had a hematoma thereafter which was noticeable and painful.    Since then she has noted some pain and discomfort in asymmetry of the right distal lateral thigh, she indicates over the last several months she has had increasing discomfort, numbness and tingling in this area.  She states that there was an MRI performed, I see an MRI performed on 5/26/2022-this shows fat necrosis of the distal lateral thigh.    Past medical history is noncontributory, she takes medications for thyroid, blood pressure, depression, past surgical history is notable for awk pexy, abdominoplasty, bilateral thigh lift for weight loss.  This was performed years ago.    All other systems have been reviewed with the patient and have been found to be negative with  exception to the chief complaint as mentioned in the history of present illness.    ROS: As noted in history of present illness  - CONSTITUTIONAL: Denies weight loss, fever and chills.  - HEENT: Denies changes in vision and hearing.  - RESPIRATORY: Denies SOB and cough, difficulty breathing  - CV: Denies palpitations and CP  - GI: Denies abdominal pain, nausea, vomiting and diarrhea.  - : Denies dysuria and urinary frequency.  - MSK: Denies myalgia and joint pain.  - SKIN: Denies rash and pruritus.  - NEUROLOGICAL: Denies headache and syncope.  - PSYCHIATRIC: Denies recent changes in mood. Denies anxiety and depression.    I reviewed with the patient the remainder of the his personal, medical, surgical and social history, found not to be pertinent to chief complaint. I specifically reviewed the family history with patient, found not to be pertinent to chief complaint.        Objective   Vitals:    12/19/24 0830   BP: 128/78   Pulse: 62       Gen: interactive and pleasant  Head: NCAT  Eyes: EOMI, PERRLA  Mouth: MMM  Throat: trachea midline  Cor: RRR  Pulm: nonlabored breathing  Abd: s/nt/nd  Neuro: AAOx3  Ext: extremities perfused    Body mass index is 31.46 kg/m².      Focused exam of the: Bilateral thighs    Right thigh: 63 cm circumference, there is a bulge anterior laterally, there is a palpable nodule at the lateral distal thigh, proximal to the patella this measures approximately 6 cm.    Left thigh: 59 cm in circumference.      Imaging: I reviewed MRI of the femur from 5/26/2022 showing fat necrosis of the distal lateral thigh.      Assessment/Plan      Lizeth is a 54 y.o. 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  Patient has a nodule in the right lateral distal thigh, this is painful and bothersome.  The patient does have asymmetry of the thighs which I think is more cosmetic.  Patient does have some  lipodystrophy and abnormal lipomatous distribution of the anterior thigh.    I like to order another MRI given that the previous MRI was last performed in 2022 to evaluate specifically this nodule of the right lateral distal thigh      Plan:   MRI of the right lateral thigh  Phone call follow-up  We discussed possible excision of the nodule, with the addition of cosmetic liposuction to the bilateral thighs for lipomatous excision.    I spent 60 minutes with this patient. Greater than 50% of this time was spent in the counselling and/or coordination of care of this patient.  This note was created using voice recognition software and was not corrected for typographical or grammatical errors.    Signature: Edgar De La Cruz MD   Date: 12/19/2024

## 2024-12-19 NOTE — PROGRESS NOTES
Division of Plastic & Reconstructive Surgery            New patient visit    Date: 12/19/2024   Referring Provider:   Emily Adams MD  [address]  [place] [address]      Subjective   Lizeth Arriaza is a 54 y.o. female who was referred by Emily Adams  for right thigh asymmetry, fat necrosis due to prior injury.  Patient indicates that she has a history of a fall through a floor event that was open while she was sweeping at an approximately 2021, the right thigh fell through the floor and got stuck all the level of her mid thigh causing injury.  She indicates that that she had a hematoma thereafter which was noticeable and painful.    Since then she has noted some pain and discomfort in asymmetry of the right distal lateral thigh, she indicates over the last several months she has had increasing discomfort, numbness and tingling in this area.  She states that there was an MRI performed, I see an MRI performed on 5/26/2022-this shows fat necrosis of the distal lateral thigh.    Past medical history is noncontributory, she takes medications for thyroid, blood pressure, depression, past surgical history is notable for awk pexy, abdominoplasty, bilateral thigh lift for weight loss.  This was performed years ago.    All other systems have been reviewed with the patient and have been found to be negative with exception to the chief complaint as mentioned in the history of present illness.    ROS: As noted in history of present illness  - CONSTITUTIONAL: Denies weight loss, fever and chills.  - HEENT: Denies changes in vision and hearing.  - RESPIRATORY: Denies SOB and cough, difficulty breathing  - CV: Denies palpitations and CP  - GI: Denies abdominal pain, nausea, vomiting and diarrhea.  - : Denies dysuria and urinary frequency.  - MSK: Denies myalgia and joint pain.  - SKIN: Denies rash and pruritus.  - NEUROLOGICAL: Denies headache and syncope.  - PSYCHIATRIC: Denies recent  changes in mood. Denies anxiety and depression.    I reviewed with the patient the remainder of the his personal, medical, surgical and social history, found not to be pertinent to chief complaint. I specifically reviewed the family history with patient, found not to be pertinent to chief complaint.        Objective    Vitals:    12/19/24 0830   BP: 128/78   Pulse: 62       Gen: interactive and pleasant  Head: NCAT  Eyes: EOMI, PERRLA  Mouth: MMM  Throat: trachea midline  Cor: RRR  Pulm: nonlabored breathing  Abd: s/nt/nd  Neuro: AAOx3  Ext: extremities perfused    Body mass index is 31.46 kg/m².      Focused exam of the: Bilateral thighs    Right thigh: 63 cm circumference, there is a bulge anterior laterally, there is a palpable nodule at the lateral distal thigh, proximal to the patella this measures approximately 6 cm.    Left thigh: 59 cm in circumference.      Imaging: I reviewed MRI of the femur from 5/26/2022 showing fat necrosis of the distal lateral thigh.      Assessment/Plan       Lizeth is a 54 y.o. 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  Patient has a nodule in the right lateral distal thigh, this is painful and bothersome.  The patient does have asymmetry of the thighs which I think is more cosmetic.  Patient does have some lipodystrophy and abnormal lipomatous distribution of the anterior thigh.    I like to order another MRI given that the previous MRI was last performed in 2022 to evaluate specifically this nodule of the right lateral distal thigh      Plan:   MRI of the right lateral thigh  Phone call follow-up  We discussed possible excision of the nodule, with the addition of cosmetic liposuction to the bilateral thighs for lipomatous excision.    I spent 60 minutes with this patient. Greater than 50% of this time was spent in the counselling and/or coordination of care of this patient.  This  note was created using voice recognition software and was not corrected for typographical or grammatical errors.    Signature: Edgar De La Cruz MD   Date: 12/19/2024

## 2025-01-05 DIAGNOSIS — F39 MOOD DISORDER (CMS-HCC): ICD-10-CM

## 2025-01-06 RX ORDER — BUPROPION HYDROCHLORIDE 300 MG/1
300 TABLET ORAL DAILY
Qty: 90 TABLET | Refills: 3 | Status: SHIPPED | OUTPATIENT
Start: 2025-01-06

## 2025-01-20 ENCOUNTER — HOSPITAL ENCOUNTER (OUTPATIENT)
Dept: RADIOLOGY | Facility: HOSPITAL | Age: 55
Discharge: HOME | End: 2025-01-20
Payer: COMMERCIAL

## 2025-01-20 DIAGNOSIS — M79.89 FAT NECROSIS: ICD-10-CM

## 2025-01-20 DIAGNOSIS — S79.921S: ICD-10-CM

## 2025-01-20 PROCEDURE — 73718 MRI LOWER EXTREMITY W/O DYE: CPT | Mod: RT

## 2025-01-20 PROCEDURE — 73718 MRI LOWER EXTREMITY W/O DYE: CPT | Mod: RIGHT SIDE | Performed by: STUDENT IN AN ORGANIZED HEALTH CARE EDUCATION/TRAINING PROGRAM

## 2025-01-27 DIAGNOSIS — I10 BENIGN ESSENTIAL HYPERTENSION: ICD-10-CM

## 2025-01-27 RX ORDER — ATENOLOL 25 MG/1
25 TABLET ORAL DAILY
Qty: 90 TABLET | Refills: 3 | Status: SHIPPED | OUTPATIENT
Start: 2025-01-27

## 2025-03-07 DIAGNOSIS — E03.9 HYPOTHYROIDISM, UNSPECIFIED TYPE: ICD-10-CM

## 2025-03-07 RX ORDER — LEVOTHYROXINE SODIUM 25 UG/1
25 TABLET ORAL DAILY
Qty: 30 TABLET | Refills: 8 | Status: SHIPPED | OUTPATIENT
Start: 2025-03-07 | End: 2025-12-02

## 2025-07-23 ENCOUNTER — TELEPHONE (OUTPATIENT)
Dept: PRIMARY CARE | Facility: CLINIC | Age: 55
End: 2025-07-23
Payer: COMMERCIAL

## 2025-07-23 DIAGNOSIS — N94.10 DYSPAREUNIA IN FEMALE: Primary | ICD-10-CM

## 2025-07-23 NOTE — TELEPHONE ENCOUNTER
Pt is calling to say she thinks she is having a menopause issue.  Pain and discomfort during sex all the time.  No other symptoms.  No bleeding, but sometimes it feels like their should be.  Is this a normal progression of menopause?  Is there something that can be done?      [id number] - [place], [place] - [address]  [address] [place] [address]  Phone: [phone] Fax: [phone]

## 2025-09-02 ENCOUNTER — APPOINTMENT (OUTPATIENT)
Dept: OBSTETRICS AND GYNECOLOGY | Facility: CLINIC | Age: 55
End: 2025-09-02
Payer: COMMERCIAL

## 2025-09-02 ASSESSMENT — PATIENT HEALTH QUESTIONNAIRE - PHQ9
2. FEELING DOWN, DEPRESSED OR HOPELESS: NOT AT ALL
SUM OF ALL RESPONSES TO PHQ9 QUESTIONS 1 & 2: 0
1. LITTLE INTEREST OR PLEASURE IN DOING THINGS: NOT AT ALL

## 2025-09-02 ASSESSMENT — ENCOUNTER SYMPTOMS
LOSS OF SENSATION IN FEET: 0
DEPRESSION: 0
OCCASIONAL FEELINGS OF UNSTEADINESS: 0

## 2025-10-02 ENCOUNTER — APPOINTMENT (OUTPATIENT)
Dept: OBSTETRICS AND GYNECOLOGY | Facility: CLINIC | Age: 55
End: 2025-10-02
Payer: COMMERCIAL

## 2025-10-06 ENCOUNTER — APPOINTMENT (OUTPATIENT)
Dept: PRIMARY CARE | Facility: CLINIC | Age: 55
End: 2025-10-06
Payer: COMMERCIAL